# Patient Record
Sex: FEMALE | Race: WHITE | NOT HISPANIC OR LATINO | Employment: OTHER | URBAN - METROPOLITAN AREA
[De-identification: names, ages, dates, MRNs, and addresses within clinical notes are randomized per-mention and may not be internally consistent; named-entity substitution may affect disease eponyms.]

---

## 2017-08-08 ENCOUNTER — OFFICE VISIT (OUTPATIENT)
Dept: CARDIOLOGY | Facility: CLINIC | Age: 67
End: 2017-08-08
Payer: MEDICARE

## 2017-08-08 VITALS
OXYGEN SATURATION: 90 % | HEIGHT: 65 IN | SYSTOLIC BLOOD PRESSURE: 139 MMHG | DIASTOLIC BLOOD PRESSURE: 76 MMHG | WEIGHT: 100.5 LBS | HEART RATE: 86 BPM | BODY MASS INDEX: 16.75 KG/M2

## 2017-08-08 DIAGNOSIS — R07.9 CHEST PAIN: ICD-10-CM

## 2017-08-08 DIAGNOSIS — J43.1 PANLOBULAR EMPHYSEMA: ICD-10-CM

## 2017-08-08 DIAGNOSIS — F41.9 ANXIETY: ICD-10-CM

## 2017-08-08 DIAGNOSIS — I34.1 MVP (MITRAL VALVE PROLAPSE): ICD-10-CM

## 2017-08-08 DIAGNOSIS — R07.89 CHEST PAIN, ATYPICAL: ICD-10-CM

## 2017-08-08 PROCEDURE — 93000 ELECTROCARDIOGRAM COMPLETE: CPT | Mod: S$GLB,,, | Performed by: INTERNAL MEDICINE

## 2017-08-08 PROCEDURE — 1126F AMNT PAIN NOTED NONE PRSNT: CPT | Mod: S$GLB,,, | Performed by: INTERNAL MEDICINE

## 2017-08-08 PROCEDURE — 99204 OFFICE O/P NEW MOD 45 MIN: CPT | Mod: S$GLB,,, | Performed by: INTERNAL MEDICINE

## 2017-08-08 PROCEDURE — 99999 PR PBB SHADOW E&M-EST. PATIENT-LVL III: CPT | Mod: PBBFAC,,, | Performed by: INTERNAL MEDICINE

## 2017-08-08 PROCEDURE — 1159F MED LIST DOCD IN RCRD: CPT | Mod: S$GLB,,, | Performed by: INTERNAL MEDICINE

## 2017-08-08 PROCEDURE — 3008F BODY MASS INDEX DOCD: CPT | Mod: S$GLB,,, | Performed by: INTERNAL MEDICINE

## 2017-08-08 RX ORDER — LATANOPROST 50 UG/ML
1 SOLUTION/ DROPS OPHTHALMIC NIGHTLY
COMMUNITY

## 2017-08-08 NOTE — PROGRESS NOTES
Subjective:    Patient ID:  Kaila Jansen is a 66 y.o. female who presents for evaluation of Chest Pain      HPI     [facility]  CP, MVP, anxiety, COPD     Had negative LHC by me 10 years ago  Recently seeing Dr Espinoza - reports stress test and echo this year  Gets intermittent chest heaviness    EKG NSR NSSTT changes    Review of Systems   Constitution: Negative for decreased appetite.   HENT: Negative for ear discharge.    Eyes: Negative for blurred vision.   Respiratory: Negative for hemoptysis.    Endocrine: Negative for polyphagia.   Hematologic/Lymphatic: Negative for adenopathy.   Skin: Negative for color change.   Musculoskeletal: Negative for joint swelling.   Neurological: Negative for brief paralysis.   Psychiatric/Behavioral: Negative for hallucinations.        Objective:    Physical Exam   Constitutional: She is oriented to person, place, and time. She appears well-developed and well-nourished.   HENT:   Head: Normocephalic and atraumatic.   Eyes: Conjunctivae are normal. Pupils are equal, round, and reactive to light.   Neck: Normal range of motion. Neck supple.   Cardiovascular: Normal rate, normal heart sounds and intact distal pulses.    Pulmonary/Chest: Effort normal and breath sounds normal.   Abdominal: Soft. Bowel sounds are normal.   Musculoskeletal: Normal range of motion.   Neurological: She is alert and oriented to person, place, and time.   Skin: Skin is warm and dry.         Assessment:       1. Chest pain, atypical    2. MVP (mitral valve prolapse)    3. Panlobular emphysema    4. Anxiety         Plan:       [facility]btain [facility]  OV 3 months

## 2017-09-09 ENCOUNTER — HOSPITAL ENCOUNTER (EMERGENCY)
Facility: HOSPITAL | Age: 67
Discharge: HOME | End: 2017-09-09
Attending: EMERGENCY MEDICINE
Payer: MEDICARE

## 2017-09-09 VITALS
HEART RATE: 97 BPM | HEIGHT: 65 IN | RESPIRATION RATE: 18 BRPM | OXYGEN SATURATION: 98 % | BODY MASS INDEX: 23.32 KG/M2 | SYSTOLIC BLOOD PRESSURE: 127 MMHG | DIASTOLIC BLOOD PRESSURE: 78 MMHG | TEMPERATURE: 99 F | WEIGHT: 140 LBS

## 2017-09-09 DIAGNOSIS — L03.90 WOUND CELLULITIS: Primary | ICD-10-CM

## 2017-09-09 PROCEDURE — 99283 EMERGENCY DEPT VISIT LOW MDM: CPT

## 2017-09-09 PROCEDURE — 25000003 PHARM REV CODE 250: Performed by: NURSE PRACTITIONER

## 2017-09-09 RX ORDER — MUPIROCIN 20 MG/G
1 OINTMENT TOPICAL
Status: COMPLETED | OUTPATIENT
Start: 2017-09-09 | End: 2017-09-09

## 2017-09-09 RX ORDER — DOXYCYCLINE 100 MG/1
100 CAPSULE ORAL 2 TIMES DAILY
Qty: 20 CAPSULE | Refills: 0 | Status: SHIPPED | OUTPATIENT
Start: 2017-09-09 | End: 2017-09-19

## 2017-09-09 RX ORDER — MUPIROCIN 20 MG/G
OINTMENT TOPICAL 3 TIMES DAILY
Qty: 22 G | Refills: 0 | Status: SHIPPED | OUTPATIENT
Start: 2017-09-09

## 2017-09-09 RX ADMIN — MUPIROCIN 22 G: 20 OINTMENT TOPICAL at 08:09

## 2017-09-10 NOTE — ED TRIAGE NOTES
Patient c/o bite to her right lower leg since Tuesday. Patient states she had been cleaning it with soap.

## 2017-09-10 NOTE — ED PROVIDER NOTES
"Encounter Date: 9/9/2017    SCRIBE #1 NOTE: I, Josias Montoya DWAYNE, am scribing for, and in the presence of,  Renita East NP. I have scribed the following portions of the note - Other sections scribed: HPI and ROS.       History     Chief Complaint   Patient presents with    Wound Check     " I noticed this wound here on my right inner leg." Patients sister reports "She got bit on the leg by something outside Tuesday."     CC: Wound Check     HPI: This 67 y.o. female with depression, COPD, carotid artery occlusion, brain aneurysm, lumbar disc degeneration, mitral valve prolapse, glaucoma, hip fracture and left femur fracture  presents to the ED c/o acute onset, right leg wound x4 days. Sister reports the pt was bitten while sitting outside Tuesday. Pt is unsure of what could have bitten her.  Pt PCP is Jim Meza at [facility]. No alleviating factors. Pain is exacerbated with palpation. Pt denies numbness, weakness, fever or tingling.      The history is provided by the patient. No  was used.     Review of patient's allergies indicates:   Allergen Reactions    Azithromycin     Bactrim [sulfamethoxazole-trimethoprim]     Clindamycin     Macrodantin [nitrofurantoin macrocrystalline]     Penicillins     Percodan [oxycodone hcl-oxycodone-asa]     Robaxin [methocarbamol]     Serzone [nefazodone]     Sulfa (sulfonamide antibiotics)      Past Medical History:   Diagnosis Date    Brain aneurysm     Carotid artery occlusion     COPD (chronic obstructive pulmonary disease)     Depression     Disc degeneration, lumbar     Femur fracture, left     Glaucoma     Hip fracture     Mitral valve prolapse      Past Surgical History:   Procedure Laterality Date    femure fracture surgery      HIP SURGERY Left     JOINT REPLACEMENT      hip     Family History   Problem Relation Age of Onset    Cancer Mother     Glaucoma Father     Cancer Father     Cancer Maternal Grandmother     Heart " disease Maternal Grandmother     Glaucoma Maternal Grandfather     Heart disease Maternal Grandfather      Social History   Substance Use Topics    Smoking status: Current Every Day Smoker     Packs/day: 1.00     Types: Cigarettes    Smokeless tobacco: Never Used    Alcohol use No     Review of Systems   Constitutional: Negative for chills, diaphoresis and fever.   HENT: Negative for ear pain and sore throat.    Eyes:        (-) eye problems   Respiratory: Negative for cough and shortness of breath.    Cardiovascular: Negative for chest pain.   Gastrointestinal: Negative for abdominal pain, diarrhea, nausea and vomiting.   Genitourinary: Negative for dysuria.   Musculoskeletal: Negative for back pain.        (-) arm pain   Skin: Positive for wound. Negative for rash.        (+) right leg wound   Neurological: Negative for headaches.       Physical Exam     Initial Vitals [09/09/17 1938]   BP Pulse Resp Temp SpO2   127/78 97 16 99.1 °F (37.3 °C) 95 %      MAP       94.33         Physical Exam    Nursing note and vitals reviewed.  Constitutional: She appears well-developed and well-nourished.   HENT:   Head: Normocephalic.   Eyes: Conjunctivae are normal.   Neck: Normal range of motion. Neck supple.   Musculoskeletal: Normal range of motion.   Neurological: She is alert and oriented to person, place, and time.   Skin: Skin is warm and dry.   Right medial mid lower leg has quarter size scabs with localized surrounding erythema.  There is no streaking, no warmth, no swelling to the area.  It is tender to palpation.  +2 dorsalis pedis and no pedal edema present         ED Course   Procedures  Labs Reviewed - No data to display          Medical Decision Making:   Initial Assessment:   67-year-old female presents with wound to her right leg ×4 days.  Differential Diagnosis:   ALLERGIC reaction  Abscess  Cellulitis  ED Management:  Diagnosis management comments: This is an urgent evaluation of a 67-year-old female that  presented to the ER with c/o wound to right lower leg . Pts exam was as above.       Based on exam today - I have low suspicion for medical, surgical or other life threatening condition.  I actually believe the wound has some localized erythema but overall does not appear to be infected.  However with the patient's history I will start her on doxycycline along with topical Bactroban.  I've encouraged patient to follow up on Monday with her primary and return to the emergency department if she has fever, worsening symptoms, or any other concerns. I believe pt is safe for discharge and outpatient f/u.    Pt verbalizes understanding of d/c instructions and will return for worsening condition.    Case discussed with attending who agrees with assessment and plan.               Scribe Attestation:   Scribe #1: I performed the above scribed service and the documentation accurately describes the services I performed. I attest to the accuracy of the note.    Attending Attestation:     Physician Attestation Statement for NP/PA:   I discussed this assessment and plan of this patient with the NP/PA, but I did not personally examine the patient. The face to face encounter was performed by the NP/PA.        Physician Attestation for Scribe:  Physician Attestation Statement for Scribe #1: I, Renita East NP, reviewed documentation, as scribed by Josias Montoya II in my presence, and it is both accurate and complete.                 ED Course      Clinical Impression:   The encounter diagnosis was Wound cellulitis.    Disposition:   Disposition: Discharged  Condition: Stable                        Renita East NP  09/09/17 0603       Matt Wang MD  09/10/17 5491

## 2017-11-14 ENCOUNTER — OFFICE VISIT (OUTPATIENT)
Dept: CARDIOLOGY | Facility: CLINIC | Age: 67
End: 2017-11-14
Payer: MEDICARE

## 2017-11-14 VITALS
DIASTOLIC BLOOD PRESSURE: 83 MMHG | RESPIRATION RATE: 15 BRPM | SYSTOLIC BLOOD PRESSURE: 132 MMHG | HEIGHT: 65 IN | HEART RATE: 75 BPM | WEIGHT: 86 LBS | BODY MASS INDEX: 14.33 KG/M2

## 2017-11-14 DIAGNOSIS — J43.1 PANLOBULAR EMPHYSEMA: Primary | ICD-10-CM

## 2017-11-14 DIAGNOSIS — I34.1 MVP (MITRAL VALVE PROLAPSE): ICD-10-CM

## 2017-11-14 DIAGNOSIS — R07.89 CHEST PAIN, ATYPICAL: ICD-10-CM

## 2017-11-14 DIAGNOSIS — I10 HTN (HYPERTENSION): ICD-10-CM

## 2017-11-14 PROCEDURE — 93000 ELECTROCARDIOGRAM COMPLETE: CPT | Mod: S$GLB,,, | Performed by: INTERNAL MEDICINE

## 2017-11-14 PROCEDURE — 99999 PR PBB SHADOW E&M-EST. PATIENT-LVL III: CPT | Mod: PBBFAC,,, | Performed by: INTERNAL MEDICINE

## 2017-11-14 PROCEDURE — 99214 OFFICE O/P EST MOD 30 MIN: CPT | Mod: S$GLB,,, | Performed by: INTERNAL MEDICINE

## 2017-11-14 RX ORDER — NITROGLYCERIN 0.4 MG/1
TABLET SUBLINGUAL
Qty: 275 TABLET | Refills: 11 | Status: SHIPPED | OUTPATIENT
Start: 2017-11-14

## 2017-11-14 RX ORDER — PROPRANOLOL HYDROCHLORIDE 10 MG/1
10 TABLET ORAL 3 TIMES DAILY
Qty: 90 TABLET | Refills: 11 | Status: SHIPPED | OUTPATIENT
Start: 2017-11-14 | End: 2017-12-28 | Stop reason: SDUPTHER

## 2017-11-14 RX ORDER — NITROGLYCERIN 0.4 MG/1
0.4 TABLET SUBLINGUAL EVERY 5 MIN PRN
Qty: 25 TABLET | Refills: 11 | Status: SHIPPED | OUTPATIENT
Start: 2017-11-14 | End: 2017-11-14 | Stop reason: SDUPTHER

## 2017-11-14 NOTE — PROGRESS NOTES
Subjective:    Patient ID:  Kaila Jansen is a 67 y.o. female who presents for follow-up of Chest Pain      HPI     [facility]  CP, MVP, anxiety, COPD      Had negative LHC by me 10 years ago    Went to the ER 9/9/17  HPI: This 67 y.o. female with depression, COPD, carotid artery occlusion, brain aneurysm, lumbar disc degeneration, mitral valve prolapse, glaucoma, hip fracture and left femur fracture  presents to the ED c/o acute onset, right leg wound x4 days. Sister reports the pt was bitten while sitting outside Tuesday. Pt is unsure of what could have bitten her.  Pt PCP is Jim Meza at [facility]. No alleviating factors. Pain is exacerbated with palpation. Pt denies numbness, weakness, fever or tingling.    Depressed - very emotional  Some atypical CP  EKG NSR - ok    Review of Systems   Constitution: Negative for decreased appetite.   HENT: Negative for ear discharge.    Eyes: Negative for blurred vision.   Respiratory: Negative for hemoptysis.    Endocrine: Negative for polyphagia.   Hematologic/Lymphatic: Negative for adenopathy.   Skin: Negative for color change.   Musculoskeletal: Negative for joint swelling.   Neurological: Negative for brief paralysis.   Psychiatric/Behavioral: Negative for hallucinations.        Objective:    Physical Exam   Constitutional: She is oriented to person, place, and time. She appears well-developed and well-nourished.   HENT:   Head: Normocephalic and atraumatic.   Eyes: Conjunctivae are normal. Pupils are equal, round, and reactive to light.   Neck: Normal range of motion. Neck supple.   Cardiovascular: Normal rate, normal heart sounds and intact distal pulses.    Pulmonary/Chest: Effort normal and breath sounds normal.   Abdominal: Soft. Bowel sounds are normal.   Musculoskeletal: Normal range of motion.   Neurological: She is alert and oriented to person, place, and time.   Skin: Skin is warm and dry.         Assessment:       1.  Panlobular emphysema    2. Chest pain, atypical    3. MVP (mitral valve prolapse)         Plan:       Cardiac stable  OV 6 months with echo and lexiscan myoview

## 2017-12-28 RX ORDER — PROPRANOLOL HYDROCHLORIDE 10 MG/1
10 TABLET ORAL 3 TIMES DAILY
Qty: 90 TABLET | Refills: 11 | Status: SHIPPED | OUTPATIENT
Start: 2017-12-28 | End: 2018-01-24 | Stop reason: SDUPTHER

## 2017-12-28 RX ORDER — FUROSEMIDE 20 MG/1
20 TABLET ORAL 2 TIMES DAILY
Qty: 60 TABLET | Refills: 11 | Status: SHIPPED | OUTPATIENT
Start: 2017-12-28

## 2017-12-28 NOTE — TELEPHONE ENCOUNTER
----- Message from Karlee Welch sent at 12/27/2017  4:40 PM CST -----  Contact: [phone]  Pt is requesting a refill on the furosemide (LASIX) 20 MG tablet and propranolol (INDERAL) 10 MG and diflucan for the mouth  tablet please send to [facility] [id number] - [place] - [address] AT [facility]

## 2017-12-31 RX ORDER — FLUCONAZOLE 100 MG/1
TABLET ORAL
Qty: 7 TABLET | Refills: 0 | Status: SHIPPED | OUTPATIENT
Start: 2017-12-31

## 2018-01-24 ENCOUNTER — TELEPHONE (OUTPATIENT)
Dept: CARDIOLOGY | Facility: CLINIC | Age: 68
End: 2018-01-24

## 2018-01-24 RX ORDER — PROPRANOLOL HYDROCHLORIDE 40 MG/1
40 TABLET ORAL 2 TIMES DAILY
Qty: 60 TABLET | Refills: 11 | Status: SHIPPED | OUTPATIENT
Start: 2018-01-24

## 2018-09-13 ENCOUNTER — TELEPHONE (OUTPATIENT)
Dept: CARDIOLOGY | Facility: CLINIC | Age: 68
End: 2018-09-13

## 2018-09-13 NOTE — TELEPHONE ENCOUNTER
----- Message from Raven Galloway MA sent at 2018  1:06 PM CDT -----  Contact: Shannon Estrada - [phone]      ----- Message -----  From: Brittany Bartlett  Sent: 2018  12:39 PM  To: [facility] L[facility] Staff    Pt's niece wanted to inform Dr. Dean that pt  on  .